# Patient Record
Sex: FEMALE | Race: WHITE | NOT HISPANIC OR LATINO | Employment: STUDENT | URBAN - METROPOLITAN AREA
[De-identification: names, ages, dates, MRNs, and addresses within clinical notes are randomized per-mention and may not be internally consistent; named-entity substitution may affect disease eponyms.]

---
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
## 2019-08-14 ENCOUNTER — OFFICE VISIT (OUTPATIENT)
Dept: FAMILY MEDICINE CLINIC | Facility: CLINIC | Age: 19
End: 2019-08-14
Payer: COMMERCIAL

## 2019-08-14 VITALS
HEIGHT: 63 IN | SYSTOLIC BLOOD PRESSURE: 110 MMHG | WEIGHT: 136.2 LBS | BODY MASS INDEX: 24.13 KG/M2 | DIASTOLIC BLOOD PRESSURE: 72 MMHG

## 2019-08-14 DIAGNOSIS — Z11.1 ENCOUNTER FOR PPD TEST: ICD-10-CM

## 2019-08-14 DIAGNOSIS — Z13.6 SCREENING FOR CARDIOVASCULAR CONDITION: ICD-10-CM

## 2019-08-14 DIAGNOSIS — Z00.00 ANNUAL PHYSICAL EXAM: Primary | ICD-10-CM

## 2019-08-14 DIAGNOSIS — Z13.1 SCREENING FOR DIABETES MELLITUS: ICD-10-CM

## 2019-08-14 PROBLEM — J45.40 MODERATE PERSISTENT ASTHMA WITHOUT COMPLICATION: Status: ACTIVE | Noted: 2017-06-15

## 2019-08-14 PROCEDURE — 86580 TB INTRADERMAL TEST: CPT | Performed by: FAMILY MEDICINE

## 2019-08-14 PROCEDURE — 99395 PREV VISIT EST AGE 18-39: CPT | Performed by: FAMILY MEDICINE

## 2019-08-14 NOTE — PROGRESS NOTES
Assessment/Plan:    No problem-specific Assessment & Plan notes found for this encounter  There are no diagnoses linked to this encounter  Subjective:   Chief Complaint   Patient presents with    Physical Exam     work           Patient ID: David Tompkins is a 20 y o  female      HPI    The following portions of the patient's history were reviewed and updated as appropriate: allergies, current medications, past family history, past medical history, past social history, past surgical history and problem list     Review of Systems      Objective:      /72   Ht 5' 3" (1 6 m)   Wt 61 8 kg (136 lb 3 2 oz)   BMI 24 13 kg/m²          Physical Exam

## 2019-08-14 NOTE — ASSESSMENT & PLAN NOTE
Discussed safe sex and diet and exercise Patient to followup with the Gyn as scheduled I will see her in one year

## 2019-08-14 NOTE — PATIENT INSTRUCTIONS

## 2019-08-14 NOTE — PROGRESS NOTES
ADULT ANNUAL PHYSICAL  [facility]    NAME: Diane Callahan  AGE: 20 y o  SEX: female  : 2000     DATE: 2019     Assessment and Plan:     Problem List Items Addressed This Visit        Other    Annual physical exam - Primary     Discussed safe sex and diet and exercise Patient to followup with the Gyn as scheduled I will see her in one year           Other Visit Diagnoses     Screening for diabetes mellitus        Relevant Orders    Glucose, fasting    Screening for cardiovascular condition        Relevant Orders    Lipid panel    Encounter for PPD test        Relevant Orders    TB Skin Test (aka PPD)          Immunizations and preventive care screenings were discussed with patient today  Appropriate education was printed on patient's after visit summary  Counseling:  Dental Health: discussed importance of regular tooth brushing, flossing, and dental visits  Injury prevention: discussed safety/seat belts, safety helmets, smoke detectors, carbon dioxide detectors, and smoking near bedding or upholstery  · Sexual health: discussed sexually transmitted diseases, partner selection, use of condoms, avoidance of unintended pregnancy, and contraceptive alternatives  Return in 1 year (on 2020)  Chief Complaint:     Chief Complaint   Patient presents with    Physical Exam     work       History of Present Illness:     Adult Annual Physical   Patient here for a comprehensive physical exam  The patient reports no problems 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  Diet and Physical Activity  · Diet/Nutrition: well balanced diet  · Exercise: no formal exercise        Depression Screening  PHQ-9 Depression Screening    PHQ-9:    Frequency of the following problems over the past two weeks:       Little interest or pleasure in doing things:  0 - not at all  Feeling down, depressed, or hopeless:  0 - not at all  PHQ-2 Score:  0 General Health  · Sleep: gets 4-6 hours of sleep on average  · Hearing: normal - bilateral   · Vision: no vision problems  · Dental: regular dental visits, no dental visits for >1 year, brushes teeth three times daily and flosses teeth occasionally  /GYN Health  · Last menstrual period: May 25, 2019  · Contraceptive method: oral contraceptives  · History of STDs?: no      Review of Systems:     Review of Systems   Constitutional: Negative for fatigue, fever and unexpected weight change  HENT: Negative for congestion, sinus pain and trouble swallowing  Eyes: Negative for discharge and visual disturbance  Respiratory: Negative for cough, chest tightness, shortness of breath and wheezing  Cardiovascular: Negative for chest pain, palpitations and leg swelling  Gastrointestinal: Negative for abdominal pain, blood in stool, constipation, diarrhea, nausea and vomiting  Genitourinary: Negative for difficulty urinating, dysuria, frequency and hematuria  Musculoskeletal: Negative for arthralgias, gait problem and joint swelling  Skin: Negative for rash and wound  Allergic/Immunologic: Negative for environmental allergies and food allergies  Neurological: Negative for dizziness, syncope, weakness, numbness and headaches  Hematological: Negative for adenopathy  Does not bruise/bleed easily  Psychiatric/Behavioral: Negative for confusion, decreased concentration and sleep disturbance  The patient is not nervous/anxious  Past Medical History:     Past Medical History:   Diagnosis Date    Allergic     Asthma     History of cold urticaria     Nocturnal enuresis     Resolved 8/24/2015     Tonic seizure (HCC)     Generalized convulsive  Resolved 8/24/2015       Past Surgical History:     No past surgical history on file     Social History:     Social History     Socioeconomic History    Marital status: Single     Spouse name: None    Number of children: None    Years of education: None    Highest education level: None   Occupational History    None   Social Needs    Financial resource strain: None    Food insecurity:     Worry: None     Inability: None    Transportation needs:     Medical: None     Non-medical: None   Tobacco Use    Smoking status: Never Smoker    Smokeless tobacco: Never Used   Substance and Sexual Activity    Alcohol use: Yes     Comment: Denied: History of alcohol use - As per [employer]     Drug use: Never    Sexual activity: None   Lifestyle    Physical activity:     Days per week: None     Minutes per session: None    Stress: None   Relationships    Social connections:     Talks on phone: None     Gets together: None     Attends Anabaptist service: None     Active member of club or organization: None     Attends meetings of clubs or organizations: None     Relationship status: None    Intimate partner violence:     Fear of current or ex partner: None     Emotionally abused: None     Physically abused: None     Forced sexual activity: None   Other Topics Concern    None   Social History Narrative    None      Family History:     Family History   Problem Relation Age of Onset    Anemia Mother     Anxiety disorder Mother     Diabetes Maternal Grandmother     Arthritis Family       Current Medications:     Current Outpatient Medications   Medication Sig Dispense Refill    albuterol (VENTOLIN HFA) 90 mcg/act inhaler Inhale 2 puffs every 6 (six) hours      beclomethasone (QVAR REDIHALER) 40 MCG/ACT inhaler Qvar 40 mcg/actuation Metered Aerosol oral inhaler   INHALE 2 PUFFS BY MOUTH EACH MORNING AS DIRECTED INCREASE WITH FLARES AS DIRECTED      beclomethasone (QVAR) 40 MCG/ACT inhaler Inhale 2 puffs 2 (two) times a day      BENZOCAINE, DENTAL, 20 % CREA Apply to the mouth or throat 4 (four) times a day as needed (discomfort) (Patient not taking: Reported on 8/14/2019) 1 Tube 0    drospirenone-ethinyl estradiol (LORYNA) 3-0 02 MG per tablet Take 1 tablet by mouth daily      EPINEPHrine (EPIPEN 2-ANNALISE) 0 3 mg/0 3 mL SOAJ Inject 0 3 mL as directed      sulfacetamide (BLEPH-10) 10 % ophthalmic solution Apply to eye       No current facility-administered medications for this visit  Allergies:     No Known Allergies   Physical Exam:     /72   Ht 5' 3" (1 6 m)   Wt 61 8 kg (136 lb 3 2 oz)   BMI 24 13 kg/m²     Physical Exam   Constitutional: She is oriented to person, place, and time  She appears well-developed and well-nourished  HENT:   Head: Normocephalic and atraumatic  Right Ear: Hearing, tympanic membrane and external ear normal    Left Ear: Hearing, tympanic membrane and external ear normal    Eyes: Pupils are equal, round, and reactive to light  Conjunctivae and EOM are normal    Neck: Neck supple  No thyromegaly present  Cardiovascular: Normal rate and normal heart sounds  Pulmonary/Chest: Effort normal and breath sounds normal  She has no wheezes  She has no rales  Abdominal: Soft  Bowel sounds are normal  She exhibits no distension  There is no tenderness  Musculoskeletal: She exhibits no edema or tenderness  Lymphadenopathy:     She has no cervical adenopathy  Neurological: She is alert and oriented to person, place, and time  No cranial nerve deficit  Coordination normal    Skin: Skin is warm and dry  No rash noted  Psychiatric: She has a normal mood and affect  Her behavior is normal  Judgment and thought content normal    Nursing note and [place]   [id number]

## 2019-08-16 LAB
INDURATION: 0 MM
TB SKIN TEST: NEGATIVE

## 2019-08-28 ENCOUNTER — CLINICAL SUPPORT (OUTPATIENT)
Dept: FAMILY MEDICINE CLINIC | Facility: CLINIC | Age: 19
End: 2019-08-28
Payer: COMMERCIAL

## 2019-08-28 DIAGNOSIS — Z11.1 PPD SCREENING TEST: Primary | ICD-10-CM

## 2019-08-28 PROCEDURE — 86580 TB INTRADERMAL TEST: CPT

## 2019-09-03 LAB
CHOLEST SERPL-MCNC: 152 MG/DL (ref 100–199)
GLUCOSE SERPL-MCNC: 89 MG/DL (ref 65–99)
HDLC SERPL-MCNC: 66 MG/DL
LDLC SERPL CALC-MCNC: 68 MG/DL (ref 0–99)
SL AMB VLDL CHOLESTEROL CALC: 18 MG/DL (ref 5–40)
TRIGL SERPL-MCNC: 92 MG/DL (ref 0–149)

## 2022-09-14 ENCOUNTER — OFFICE VISIT (OUTPATIENT)
Dept: URGENT CARE | Facility: MEDICAL CENTER | Age: 22
End: 2022-09-14
Payer: COMMERCIAL

## 2022-09-14 VITALS
HEART RATE: 84 BPM | OXYGEN SATURATION: 99 % | RESPIRATION RATE: 18 BRPM | TEMPERATURE: 97.6 F | DIASTOLIC BLOOD PRESSURE: 68 MMHG | SYSTOLIC BLOOD PRESSURE: 104 MMHG

## 2022-09-14 DIAGNOSIS — J06.9 ACUTE URI: Primary | ICD-10-CM

## 2022-09-14 DIAGNOSIS — R09.82 POST-NASAL DRIP: ICD-10-CM

## 2022-09-14 PROCEDURE — 99213 OFFICE O/P EST LOW 20 MIN: CPT | Performed by: EMERGENCY MEDICINE

## 2022-09-14 RX ORDER — FLUTICASONE PROPIONATE 50 MCG
1 SPRAY, SUSPENSION (ML) NASAL DAILY
Qty: 9.9 ML | Refills: 0 | Status: SHIPPED | OUTPATIENT
Start: 2022-09-14

## 2022-09-14 RX ORDER — OFLOXACIN 3 MG/ML
1 SOLUTION/ DROPS OPHTHALMIC AS NEEDED
COMMUNITY
Start: 2022-09-04

## 2022-09-14 NOTE — PROGRESS NOTES
[email or web address]        NAME: Astrid Brooks is a 20 y o  female  : 2000    MRN: [id number]  DATE: 2022  TIME: 11:39 AM    Assessment and Plan   Post-nasal drip [R09 82]  1  Post-nasal drip  fluticasone (FLONASE) 50 mcg/act nasal spray   2  Acute URI           Patient Instructions     Your evaluation suggests that your symptoms are most likely due to a viral illness, which will improve on its own with rest and fluids  We recommend you take 600mg ibuprofen every 6 hours or tylenol 650mg every 6 hours as needed for fever  If needed, you can alternate these medications so that you take one medication every 3 hours  For instance, at noon take ibuprofen, then at 3pm take tylenol, then at 6pm take ibuprofen  Over the counter allergy medication like Claritin, Allegra or Zyrtec can help with nasal congestion and post nasal drip  Over the counter steroid nasal sprays like Flonase can help with nasal congestion and post nasal drip as well  Delsym, an over the counter cough medication may be used every 12 hours as needed  Mucinex XR (guafenisen) 600 mg tablets may be used to thin out the mucous to make it easier to cough up  You may take 1-2 tablets twice per day as needed  Salt water gargles with 1 teaspoon of salt dissolved in 6-8 oz of water as needed can help with a sore throat  Please schedule an appointment for follow up with your primary care physician this week  Return to the Emergency Department if you experience worsening cough, fever 100 4 ° F or greater  that is not controlled by Tylenol or Ibuprofen, recurrent vomiting, chest pain, shortness of breath, or any other concerning symptoms  Follow up with PCP in 3-5 days  Proceed to  ER if symptoms worsen  Chief Complaint     Chief Complaint   Patient presents with    Cough     Pt C/O cough and sore throat for the last 2 weeks, was seen in urgent care   Strep result negative and covid test negative at the time  Pt states no improvement and reports slight fevers  History of Present Illness       19 y/o female presents today with a cough and sore throat x 2 weeks  States she was seen at urgent care about 10 days ago and had a negative strep and negative COVID swab  Is not taking anything for her symptoms  Reports a 'mild fever' last night but Tmax was 99  Review of Systems   Review of Systems   Constitutional: Negative for chills, fatigue and fever  HENT: Positive for congestion and sore throat  Negative for postnasal drip and trouble swallowing  Respiratory: Negative for chest tightness and shortness of breath  Gastrointestinal: Negative for abdominal pain  Genitourinary: Negative for dysuria  Skin: Negative for rash  Allergic/Immunologic: Negative for immunocompromised state  Neurological: Negative for dizziness, light-headedness and headaches           Current Medications       Current Outpatient Medications:     albuterol (PROVENTIL HFA,VENTOLIN HFA) 90 mcg/act inhaler, Inhale 2 puffs every 6 (six) hours, Disp: , Rfl:     beclomethasone (QVAR REDIHALER) 40 MCG/ACT inhaler, Qvar 40 mcg/actuation Metered Aerosol oral inhaler  INHALE 2 PUFFS BY MOUTH EACH MORNING AS DIRECTED INCREASE WITH FLARES AS DIRECTED, Disp: , Rfl:     beclomethasone (QVAR) 40 MCG/ACT inhaler, Inhale 2 puffs 2 (two) times a day, Disp: , Rfl:     drospirenone-ethinyl estradiol (MALLY) 3-0 02 MG per tablet, Take 1 tablet by mouth daily, Disp: , Rfl:     fluticasone (FLONASE) 50 mcg/act nasal spray, 1 spray into each nostril daily, Disp: 9 9 mL, Rfl: 0    ofloxacin (OCUFLOX) 0 3 % ophthalmic solution, Administer 1 drop to both eyes if needed, Disp: , Rfl:     BENZOCAINE, DENTAL, 20 % CREA, Apply to the mouth or throat 4 (four) times a day as needed (discomfort) (Patient not taking: Reported on 8/14/2019), Disp: 1 Tube, Rfl: 0    EPINEPHrine (EPIPEN 2-ANNALISE) 0 3 mg/0 3 mL SOAJ, Inject 0 3 mL as directed, Disp: , Rfl:     Current Allergies     Allergies as of 09/14/2022 - Reviewed 09/14/2022   Allergen Reaction Noted    Pineapple - food allergy Swelling 58/76/2298    Aluminum silicate Rash 15/75/2300            The following portions of the patient's history were reviewed and updated as appropriate: allergies, current medications, past family history, past medical history, past social history, past surgical history and problem list      Past Medical History:   Diagnosis Date    Allergic     Asthma     History of cold urticaria     Nocturnal enuresis     Resolved 8/24/2015     Tonic seizure ([address] )     Generalized convulsive  Resolved 8/24/2015        No past surgical history on file  Family History   Problem Relation Age of Onset    Anemia Mother     Anxiety disorder Mother     Diabetes Maternal Grandmother     Arthritis Family          Medications have been verified  Objective   /68   Pulse 84   Temp 97 6 °F (36 4 °C)   Resp 18   SpO2 99%        Physical Exam     Physical Exam  Vitals and nursing note reviewed  Constitutional:       Appearance: Normal appearance  HENT:      Head: Normocephalic and atraumatic  Right Ear: Tympanic membrane, ear canal and external ear normal       Left Ear: Tympanic membrane, ear canal and external ear normal       Nose: Nose normal       Mouth/Throat:      Mouth: Mucous membranes are moist       Comments: Moderate clear post nasal drip  Skin:     General: Skin is warm and dry  Capillary Refill: Capillary refill takes less than 2 seconds  Neurological:      General: No focal deficit present  Mental Status: She is alert and oriented to person, place, and time     Psychiatric:         Mood and Affect: Mood normal          Behavior: Behavior normal

## 2022-09-14 NOTE — PATIENT INSTRUCTIONS
Your evaluation suggests that your symptoms are most likely due to a viral illness, which will improve on its own with rest and fluids  We recommend you take 600mg ibuprofen every 6 hours or tylenol 650mg every 6 hours as needed for fever  If needed, you can alternate these medications so that you take one medication every 3 hours  For instance, at noon take ibuprofen, then at 3pm take tylenol, then at 6pm take ibuprofen  Over the counter allergy medication like Claritin, Allegra or Zyrtec can help with nasal congestion and post nasal drip  Over the counter steroid nasal sprays like Flonase can help with nasal congestion and post nasal drip as well  Delsym, an over the counter cough medication may be used every 12 hours as needed  Mucinex XR (guafenisen) 600 mg tablets may be used to thin out the mucous to make it easier to cough up  You may take 1-2 tablets twice per day as needed  Salt water gargles with 1 teaspoon of salt dissolved in 6-8 oz of water as needed can help with a sore throat  Please schedule an appointment for follow up with your primary care physician this week  Return to the Emergency Department if you experience worsening cough, fever 100 4 ° F or greater  that is not controlled by Tylenol or Ibuprofen, recurrent vomiting, chest pain, shortness of breath, or any other concerning symptoms

## 2023-12-15 ENCOUNTER — APPOINTMENT (OUTPATIENT)
Dept: LAB | Facility: MEDICAL CENTER | Age: 23
End: 2023-12-15
Payer: COMMERCIAL

## 2023-12-15 DIAGNOSIS — E66.3 EXCESS WEIGHT: ICD-10-CM

## 2023-12-15 DIAGNOSIS — Z13.0 SCREENING FOR IRON DEFICIENCY ANEMIA: ICD-10-CM

## 2023-12-15 LAB
ALBUMIN SERPL BCP-MCNC: 4.2 G/DL (ref 3.5–5)
ALP SERPL-CCNC: 47 U/L (ref 34–104)
ALT SERPL W P-5'-P-CCNC: 19 U/L (ref 7–52)
ANION GAP SERPL CALCULATED.3IONS-SCNC: 9 MMOL/L
AST SERPL W P-5'-P-CCNC: 21 U/L (ref 13–39)
BASOPHILS # BLD AUTO: 0.05 THOUSANDS/ÂΜL (ref 0–0.1)
BASOPHILS NFR BLD AUTO: 1 % (ref 0–1)
BILIRUB SERPL-MCNC: 0.28 MG/DL (ref 0.2–1)
BUN SERPL-MCNC: 9 MG/DL (ref 5–25)
CALCIUM SERPL-MCNC: 9.1 MG/DL (ref 8.4–10.2)
CHLORIDE SERPL-SCNC: 105 MMOL/L (ref 96–108)
CO2 SERPL-SCNC: 25 MMOL/L (ref 21–32)
CREAT SERPL-MCNC: 0.82 MG/DL (ref 0.6–1.3)
EOSINOPHIL # BLD AUTO: 0.37 THOUSAND/ÂΜL (ref 0–0.61)
EOSINOPHIL NFR BLD AUTO: 4 % (ref 0–6)
ERYTHROCYTE [DISTWIDTH] IN BLOOD BY AUTOMATED COUNT: 11.9 % (ref 11.6–15.1)
EST. AVERAGE GLUCOSE BLD GHB EST-MCNC: 103 MG/DL
GFR SERPL CREATININE-BSD FRML MDRD: 100 ML/MIN/1.73SQ M
GLUCOSE SERPL-MCNC: 89 MG/DL (ref 65–140)
HBA1C MFR BLD: 5.2 %
HCT VFR BLD AUTO: 40.3 % (ref 34.8–46.1)
HGB BLD-MCNC: 13.8 G/DL (ref 11.5–15.4)
IMM GRANULOCYTES # BLD AUTO: 0.03 THOUSAND/UL (ref 0–0.2)
IMM GRANULOCYTES NFR BLD AUTO: 0 % (ref 0–2)
LYMPHOCYTES # BLD AUTO: 2.41 THOUSANDS/ÂΜL (ref 0.6–4.47)
LYMPHOCYTES NFR BLD AUTO: 23 % (ref 14–44)
MCH RBC QN AUTO: 31.4 PG (ref 26.8–34.3)
MCHC RBC AUTO-ENTMCNC: 34.2 G/DL (ref 31.4–37.4)
MCV RBC AUTO: 92 FL (ref 82–98)
MONOCYTES # BLD AUTO: 1.12 THOUSAND/ÂΜL (ref 0.17–1.22)
MONOCYTES NFR BLD AUTO: 11 % (ref 4–12)
NEUTROPHILS # BLD AUTO: 6.47 THOUSANDS/ÂΜL (ref 1.85–7.62)
NEUTS SEG NFR BLD AUTO: 61 % (ref 43–75)
NRBC BLD AUTO-RTO: 0 /100 WBCS
PLATELET # BLD AUTO: 311 THOUSANDS/UL (ref 149–390)
PMV BLD AUTO: 10.7 FL (ref 8.9–12.7)
POTASSIUM SERPL-SCNC: 3.7 MMOL/L (ref 3.5–5.3)
PROT SERPL-MCNC: 7.4 G/DL (ref 6.4–8.4)
RBC # BLD AUTO: 4.4 MILLION/UL (ref 3.81–5.12)
SODIUM SERPL-SCNC: 139 MMOL/L (ref 135–147)
T3FREE SERPL-MCNC: 3.81 PG/ML (ref 2.5–3.9)
T4 FREE SERPL-MCNC: 0.88 NG/DL (ref 0.61–1.12)
TSH SERPL DL<=0.05 MIU/L-ACNC: 2.57 UIU/ML (ref 0.45–4.5)
WBC # BLD AUTO: 10.45 THOUSAND/UL (ref 4.31–10.16)

## 2023-12-15 PROCEDURE — 83036 HEMOGLOBIN GLYCOSYLATED A1C: CPT

## 2023-12-15 PROCEDURE — 80053 COMPREHEN METABOLIC PANEL: CPT

## 2023-12-15 PROCEDURE — 85025 COMPLETE CBC W/AUTO DIFF WBC: CPT

## 2023-12-15 PROCEDURE — 84481 FREE ASSAY (FT-3): CPT

## 2023-12-15 PROCEDURE — 84439 ASSAY OF FREE THYROXINE: CPT

## 2023-12-15 PROCEDURE — 36415 COLL VENOUS BLD VENIPUNCTURE: CPT

## 2023-12-15 PROCEDURE — 84443 ASSAY THYROID STIM HORMONE: CPT
